# Patient Record
Sex: MALE | URBAN - METROPOLITAN AREA
[De-identification: names, ages, dates, MRNs, and addresses within clinical notes are randomized per-mention and may not be internally consistent; named-entity substitution may affect disease eponyms.]

---

## 2020-10-19 ENCOUNTER — IMPORTED ENCOUNTER (OUTPATIENT)
Dept: URBAN - METROPOLITAN AREA CLINIC 9 | Facility: CLINIC | Age: 65
End: 2020-10-19

## 2020-11-02 ENCOUNTER — IMPORTED ENCOUNTER (OUTPATIENT)
Dept: URBAN - METROPOLITAN AREA CLINIC 9 | Facility: CLINIC | Age: 65
End: 2020-11-02

## 2020-11-23 ENCOUNTER — IMPORTED ENCOUNTER (OUTPATIENT)
Dept: URBAN - METROPOLITAN AREA CLINIC 9 | Facility: CLINIC | Age: 65
End: 2020-11-23

## 2021-02-23 ENCOUNTER — IMPORTED ENCOUNTER (OUTPATIENT)
Dept: URBAN - METROPOLITAN AREA CLINIC 9 | Facility: CLINIC | Age: 66
End: 2021-02-23

## 2021-04-27 NOTE — PATIENT DISCUSSION
The types of intraocular lenses were reviewed with the patient along with a discussion of their various strengths and weaknesses. Patient a candidate for up to ADV.

## 2021-04-27 NOTE — PATIENT DISCUSSION
Custom monovision helps to lessen the dependence on glasses, but is a compromise and glasses are often still needed for some activities including driving, binocular vision, and intermediate area activities. The patient understands there is a possibility they may need an enhancement after surgery. The patient elects Custom Vision, goal of -1.50.

## 2021-10-16 ASSESSMENT — TONOMETRY
OD_IOP_MMHG: 10
OD_IOP_MMHG: 12
OS_IOP_MMHG: 11
OD_IOP_MMHG: 13

## 2021-10-16 ASSESSMENT — VISUAL ACUITY
OD_CC: 20/50 -2 SN
OS_CC: 20/20 SN
OD_CC: 20/20 - SN
OD_CC: 20/20 SN
OD_SC: 20/25 -2 SN
OD_SC: 20/20 -2 SN
OS_CC: 20/20 SN
OD_CC: 20/50 - SN

## 2021-10-16 ASSESSMENT — KERATOMETRY
OD_K2POWER_DIOPTERS: 42
OD_AXISANGLE2_DEGREES: 86
OS_AXISANGLE_DEGREES: 45
OS_K2POWER_DIOPTERS: 41.5
OD_AXISANGLE_DEGREES: 176
OS_K1POWER_DIOPTERS: 41.25
OD_K1POWER_DIOPTERS: 41.5
OS_AXISANGLE2_DEGREES: 135

## 2022-07-03 RX ORDER — OSELTAMIVIR PHOSPHATE 75 MG/1
1 CAPSULE ORAL
COMMUNITY
Start: 2021-12-05

## 2022-07-03 RX ORDER — LISINOPRIL 10 MG/1
TABLET ORAL
COMMUNITY